# Patient Record
Sex: MALE | Race: WHITE | NOT HISPANIC OR LATINO | URBAN - METROPOLITAN AREA
[De-identification: names, ages, dates, MRNs, and addresses within clinical notes are randomized per-mention and may not be internally consistent; named-entity substitution may affect disease eponyms.]

---

## 2017-01-04 NOTE — PATIENT DISCUSSION
Macular Hole Counseling:  I have reviewed with the patient the diagnosis of macular hole and its pathophysiology. Patient was instructed to call the office if there is a decrease in vision. Return for follow-up as scheduled.

## 2017-01-04 NOTE — PATIENT DISCUSSION
AMD (Dry) Counseling:  I have instructed the patient to take an AREDS 2 vitamin mixture to minimize the risk of disease progression. The importance of daily monitoring with Amsler grid was emphasized and an Amsler grid was provided if the patient did not have one. The patient was advised to call the office if new symptoms of persistent blurring or distortion of vision arise as evaluation and possible treatment is necessary to preserve as much vision as possible. Return for follow-up as scheduled.

## 2017-01-04 NOTE — PATIENT DISCUSSION
Macular Drusen Counseling:   I have explained the diagnosis of macular drusen and the role of drusen in development of macular degeneration. AREDS 2 multivitamin may have some benefit, as well as UV protection when outdoors and a nutritious diet, especially green leafy vegetables and fish to minimize the risk of developing macular degeneration. The patient was advised of the importance of annual dilated eye exams. Return for follow-up as scheduled.

## 2017-01-04 NOTE — PATIENT DISCUSSION
AMD (DRY), OD:  PRESCRIBE AREDS 2 VITAMINS / AMSLER GRID DAILY / UV PROTECTION. SMOKING CESSATION EMPHASIZED. RETURN FOR FOLLOW-UP AS SCHEDULED.

## 2017-01-04 NOTE — PATIENT DISCUSSION
MACULAR DRUSEN, OS:  PRESCRIBE AREDS 2 VITAMINS AND INCREASE UV PROTECTION. STRESS GOOD DIET AND NO SMOKING. RETURN FOR FOLLOW-UP AS SCHEDULED.

## 2017-01-04 NOTE — PATIENT DISCUSSION
MILD DRY EYE, OU: PRESCRIBED REFRESH OPTIVE  ARTIFICIAL TEARS PRN OU. RECOMMENDS OMEGA-3 FISH OIL WITH PRIMARY CARE PHYSICIANS APPROVAL. RETURN FOR FOLLOW-UP AS SCHEDULED OR SOONER IF SYMPTOMS WORSEN.

## 2017-01-04 NOTE — PATIENT DISCUSSION
*Posterior Capsular Fibrosis Counseling: The posterior capsule is a thin, clear film that is behind the intraocular lens implant. When it becomes cloudy, it is called posterior capsular fibrosis. The diagnosis of posterior capsular fibrosis (PCF), also referred to as a secondary cataract or posterior capsular opacification (PCO), was discussed with the patient. Return for follow-up as scheduled or sooner if there is a change in vision.

## 2017-01-04 NOTE — PATIENT DISCUSSION
*PCF OU; BECOMING VISUALLY SIGNIFICANT BUT NOT BOTHERSOME TO PATIENT AT THIS TIME. CONTINUE TO FOLLOW FOR NOW. OFFER SPEC RX UPDATE.

## 2017-06-27 NOTE — PATIENT DISCUSSION
I have asked her to return to Dr. Janee Hassan for general ophthalmic care in the near future. I am very pleased with her surgical outcome. She is to return here on an as needed basis.

## 2017-11-29 NOTE — PATIENT DISCUSSION
MACULAR DRUSEN, OS :   PRESCRIBE AREDS 2 VITAMINS AND INCREASE UV PROTECTION. STRESS GOOD DIET AND NO SMOKING. RETURN FOR FOLLOW-UP AS SCHEDULED.

## 2017-11-29 NOTE — PATIENT DISCUSSION
AMD (DRY), OD:  PRESCRIBE AREDS 2 VITAMINS / AMSLER GRID DAILY / UV PROTECTION. SMOKING AVOIDANCE ADVISED. RETURN FOR FOLLOW-UP AS SCHEDULED.

## 2018-05-02 ENCOUNTER — NEW REFERRAL (OUTPATIENT)
Dept: URBAN - METROPOLITAN AREA CLINIC 26 | Facility: CLINIC | Age: 58
End: 2018-05-02

## 2018-05-02 VITALS
WEIGHT: 253 LBS | BODY MASS INDEX: 34.27 KG/M2 | DIASTOLIC BLOOD PRESSURE: 80 MMHG | HEIGHT: 72 IN | SYSTOLIC BLOOD PRESSURE: 107 MMHG | HEART RATE: 67 BPM

## 2018-05-02 DIAGNOSIS — H02.836: ICD-10-CM

## 2018-05-02 DIAGNOSIS — H02.833: ICD-10-CM

## 2018-05-02 DIAGNOSIS — H25.13: ICD-10-CM

## 2018-05-02 DIAGNOSIS — H43.393: ICD-10-CM

## 2018-05-02 DIAGNOSIS — H35.3132: ICD-10-CM

## 2018-05-02 DIAGNOSIS — H04.123: ICD-10-CM

## 2018-05-02 DIAGNOSIS — H35.433: ICD-10-CM

## 2018-05-02 DIAGNOSIS — E11.9: ICD-10-CM

## 2018-05-02 PROCEDURE — 99204 OFFICE O/P NEW MOD 45 MIN: CPT

## 2018-05-02 PROCEDURE — 92235 FLUORESCEIN ANGRPH MLTIFRAME: CPT

## 2018-05-02 PROCEDURE — 92134 CPTRZ OPH DX IMG PST SGM RTA: CPT

## 2018-05-02 PROCEDURE — 92250 FUNDUS PHOTOGRAPHY W/I&R: CPT

## 2018-05-02 ASSESSMENT — VISUAL ACUITY
OD_SC: 20/40-1
OD_CC: 20/20-1
OS_CC: 20/20
OS_SC: 20/80+1

## 2018-05-02 ASSESSMENT — TONOMETRY
OS_IOP_MMHG: 13
OD_IOP_MMHG: 17

## 2019-05-07 ENCOUNTER — FOLLOW UP (OUTPATIENT)
Dept: URBAN - METROPOLITAN AREA CLINIC 26 | Facility: CLINIC | Age: 59
End: 2019-05-07

## 2019-05-07 VITALS — HEIGHT: 72 IN | BODY MASS INDEX: 35.21 KG/M2 | WEIGHT: 260 LBS

## 2019-05-07 DIAGNOSIS — E11.9: ICD-10-CM

## 2019-05-07 DIAGNOSIS — H02.833: ICD-10-CM

## 2019-05-07 DIAGNOSIS — H43.393: ICD-10-CM

## 2019-05-07 DIAGNOSIS — H25.13: ICD-10-CM

## 2019-05-07 DIAGNOSIS — H02.836: ICD-10-CM

## 2019-05-07 DIAGNOSIS — H35.433: ICD-10-CM

## 2019-05-07 DIAGNOSIS — H04.123: ICD-10-CM

## 2019-05-07 DIAGNOSIS — H35.3132: ICD-10-CM

## 2019-05-07 PROCEDURE — 92134 CPTRZ OPH DX IMG PST SGM RTA: CPT

## 2019-05-07 PROCEDURE — 92014 COMPRE OPH EXAM EST PT 1/>: CPT

## 2019-05-07 PROCEDURE — 92250 FUNDUS PHOTOGRAPHY W/I&R: CPT

## 2019-05-07 ASSESSMENT — TONOMETRY
OD_IOP_MMHG: 15
OS_IOP_MMHG: 16

## 2019-05-07 ASSESSMENT — VISUAL ACUITY
OS_CC: 20/20
OD_CC: 20/20-1

## 2019-09-25 NOTE — PATIENT DISCUSSION
AMD (DRY), OU:  PRESCRIBE AREDS 2 VITAMINS AND RECOMMEND UV PROTECTION. PATIENT IS AWARE OF AMSLER GRID AND HOW TO CHECK FOR PROGRESSION. SMOKING AVOIDANCE ADVISED. RETURN FOR FOLLOW-UP AS SCHEDULED. REFER PT TO DR. LIEBERMAN FOR EVALUATION.

## 2019-09-25 NOTE — PATIENT DISCUSSION
MACULAR CYST/ PSEUDOHOLE, OU:  OLD, STABLE. RETURN FOR FOLLOW-UP AS SCHEDULED. REFER TO DR. LIEBERMAN FOR EVALUATION.

## 2019-09-25 NOTE — PATIENT DISCUSSION
Continue: PreserVision AREDS 2 (vit c,n-nv-tepkt-lutein-zeaxan): capsule: 526-742-05-0 mg-unit-mg-mg 1 capsule twice a day as directed by mouth

## 2019-10-21 NOTE — PATIENT DISCUSSION
S/P YAG OS: STABLE. NO HOLES OR TEARS FOUND ON TODAY'S DILATED EXAM.  OFFER UPDATED SPEC RX.  RETURN AS SCHEDULED.

## 2019-10-21 NOTE — PATIENT DISCUSSION
Continue: PreserVision AREDS 2 (vit c,b-si-qqqfm-lutein-zeaxan): capsule: 038-443-35-4 mg-unit-mg-mg 1 capsule twice a day as directed by mouth

## 2019-10-30 NOTE — PATIENT DISCUSSION
Macular hole has closed following surgery with thinning noted on exam and OCT today.
Macular hole has closed following surgery.
Recommended seeing primary eye care provider in the future.
patient

## 2020-07-22 ENCOUNTER — FOLLOW UP (OUTPATIENT)
Dept: URBAN - METROPOLITAN AREA CLINIC 26 | Facility: CLINIC | Age: 60
End: 2020-07-22

## 2020-07-22 VITALS — HEIGHT: 72 IN | BODY MASS INDEX: 35.21 KG/M2 | WEIGHT: 260 LBS

## 2020-07-22 DIAGNOSIS — E11.9: ICD-10-CM

## 2020-07-22 DIAGNOSIS — D31.32: ICD-10-CM

## 2020-07-22 DIAGNOSIS — H35.433: ICD-10-CM

## 2020-07-22 DIAGNOSIS — H35.3132: ICD-10-CM

## 2020-07-22 DIAGNOSIS — H43.393: ICD-10-CM

## 2020-07-22 PROCEDURE — 92250 FUNDUS PHOTOGRAPHY W/I&R: CPT

## 2020-07-22 PROCEDURE — 92134 CPTRZ OPH DX IMG PST SGM RTA: CPT

## 2020-07-22 PROCEDURE — 92014 COMPRE OPH EXAM EST PT 1/>: CPT

## 2020-07-22 ASSESSMENT — TONOMETRY
OD_IOP_MMHG: 14
OS_IOP_MMHG: 10

## 2020-07-22 ASSESSMENT — VISUAL ACUITY
OS_CC: 20/20
OD_CC: 20/20-2

## 2020-10-05 NOTE — PATIENT DISCUSSION
Mrs. Fifi Polo hole's remain closed following surgical repair. I will see her on an as needed basis.

## 2021-01-22 NOTE — PATIENT DISCUSSION
Continue: PreserVision AREDS 2 (vit c,e-yx-hpflw-lutein-zeaxan): capsule: 452-915-84-1 mg-unit-mg-mg 1 capsule twice a day as directed by mouth

## 2021-01-22 NOTE — PATIENT DISCUSSION
GLAUCOMA SUSPECT, OU :  POSITIVE FAMILY HISTORY. OPTIC NERVE CUPPING OU. RETURN FOR FOLLOW UP AS SCHEDULED.

## 2021-07-21 ENCOUNTER — FOLLOW UP (OUTPATIENT)
Dept: URBAN - METROPOLITAN AREA CLINIC 26 | Facility: CLINIC | Age: 61
End: 2021-07-21

## 2021-07-21 VITALS — WEIGHT: 215 LBS | BODY MASS INDEX: 29.12 KG/M2 | HEIGHT: 72 IN

## 2021-07-21 DIAGNOSIS — H43.393: ICD-10-CM

## 2021-07-21 DIAGNOSIS — H35.433: ICD-10-CM

## 2021-07-21 DIAGNOSIS — H35.3132: ICD-10-CM

## 2021-07-21 DIAGNOSIS — D31.32: ICD-10-CM

## 2021-07-21 DIAGNOSIS — E11.9: ICD-10-CM

## 2021-07-21 PROCEDURE — 92134 CPTRZ OPH DX IMG PST SGM RTA: CPT

## 2021-07-21 PROCEDURE — 92014 COMPRE OPH EXAM EST PT 1/>: CPT

## 2021-07-21 PROCEDURE — 92250 FUNDUS PHOTOGRAPHY W/I&R: CPT

## 2021-07-21 ASSESSMENT — VISUAL ACUITY
OD_CC: 20/20-1
OS_CC: 20/20-1

## 2021-07-21 ASSESSMENT — TONOMETRY
OD_IOP_MMHG: 16
OS_IOP_MMHG: 13

## 2022-07-20 ENCOUNTER — FOLLOW UP (OUTPATIENT)
Dept: URBAN - METROPOLITAN AREA CLINIC 26 | Facility: CLINIC | Age: 62
End: 2022-07-20

## 2022-07-20 VITALS — WEIGHT: 219 LBS | BODY MASS INDEX: 29.03 KG/M2 | HEIGHT: 73 IN

## 2022-07-20 DIAGNOSIS — H35.433: ICD-10-CM

## 2022-07-20 DIAGNOSIS — H43.393: ICD-10-CM

## 2022-07-20 DIAGNOSIS — D31.32: ICD-10-CM

## 2022-07-20 DIAGNOSIS — H04.123: ICD-10-CM

## 2022-07-20 DIAGNOSIS — E11.9: ICD-10-CM

## 2022-07-20 DIAGNOSIS — H35.3132: ICD-10-CM

## 2022-07-20 PROCEDURE — 92134 CPTRZ OPH DX IMG PST SGM RTA: CPT

## 2022-07-20 PROCEDURE — 92014 COMPRE OPH EXAM EST PT 1/>: CPT

## 2022-07-20 PROCEDURE — 92250 FUNDUS PHOTOGRAPHY W/I&R: CPT

## 2022-07-20 ASSESSMENT — VISUAL ACUITY
OS_CC: 20/20-1
OD_CC: 20/20-2

## 2022-07-20 ASSESSMENT — TONOMETRY
OS_IOP_MMHG: 14
OD_IOP_MMHG: 13

## 2022-07-30 ENCOUNTER — TELEPHONE ENCOUNTER (OUTPATIENT)
Age: 62
End: 2022-07-30

## 2022-07-31 ENCOUNTER — TELEPHONE ENCOUNTER (OUTPATIENT)
Age: 62
End: 2022-07-31

## 2022-08-22 NOTE — PATIENT DISCUSSION
Lens Material: A-T Advancement Flap Text: The defect edges were debeveled with a #15 scalpel blade.  Given the location of the defect, shape of the defect and the proximity to free margins an A-T advancement flap was deemed most appropriate.  Using a sterile surgical marker, an appropriate advancement flap was drawn incorporating the defect and placing the expected incisions within the relaxed skin tension lines where possible.    The area thus outlined was incised deep to adipose tissue with a #15 scalpel blade.  The skin margins were undermined to an appropriate distance in all directions utilizing iris scissors.

## 2023-08-23 ENCOUNTER — FOLLOW UP (OUTPATIENT)
Dept: URBAN - METROPOLITAN AREA CLINIC 26 | Facility: CLINIC | Age: 63
End: 2023-08-23

## 2023-08-23 VITALS — WEIGHT: 220 LBS | HEIGHT: 72 IN | BODY MASS INDEX: 29.8 KG/M2

## 2023-08-23 DIAGNOSIS — D31.32: ICD-10-CM

## 2023-08-23 DIAGNOSIS — E11.9: ICD-10-CM

## 2023-08-23 DIAGNOSIS — H04.123: ICD-10-CM

## 2023-08-23 DIAGNOSIS — H43.393: ICD-10-CM

## 2023-08-23 DIAGNOSIS — H35.433: ICD-10-CM

## 2023-08-23 DIAGNOSIS — H35.3132: ICD-10-CM

## 2023-08-23 PROCEDURE — 92250 FUNDUS PHOTOGRAPHY W/I&R: CPT

## 2023-08-23 PROCEDURE — 92014 COMPRE OPH EXAM EST PT 1/>: CPT

## 2023-08-23 PROCEDURE — 92134 CPTRZ OPH DX IMG PST SGM RTA: CPT

## 2023-08-23 ASSESSMENT — TONOMETRY
OD_IOP_MMHG: 13
OS_IOP_MMHG: 13

## 2023-08-23 ASSESSMENT — VISUAL ACUITY
OS_CC: 20/25-1
OD_CC: 20/20

## 2024-08-28 ENCOUNTER — FOLLOW UP (OUTPATIENT)
Dept: URBAN - METROPOLITAN AREA CLINIC 26 | Facility: CLINIC | Age: 64
End: 2024-08-28

## 2024-08-28 VITALS — WEIGHT: 223 LBS | BODY MASS INDEX: 30.2 KG/M2 | HEIGHT: 72 IN

## 2024-08-28 DIAGNOSIS — H43.393: ICD-10-CM

## 2024-08-28 DIAGNOSIS — D31.32: ICD-10-CM

## 2024-08-28 DIAGNOSIS — H35.3132: ICD-10-CM

## 2024-08-28 DIAGNOSIS — H02.833: ICD-10-CM

## 2024-08-28 DIAGNOSIS — E11.9: ICD-10-CM

## 2024-08-28 DIAGNOSIS — H02.836: ICD-10-CM

## 2024-08-28 DIAGNOSIS — H35.433: ICD-10-CM

## 2024-08-28 DIAGNOSIS — H25.13: ICD-10-CM

## 2024-08-28 DIAGNOSIS — H04.123: ICD-10-CM

## 2024-08-28 PROCEDURE — 92014 COMPRE OPH EXAM EST PT 1/>: CPT

## 2024-08-28 PROCEDURE — 92134 CPTRZ OPH DX IMG PST SGM RTA: CPT

## 2024-08-28 PROCEDURE — 92250 FUNDUS PHOTOGRAPHY W/I&R: CPT | Mod: 59

## 2024-08-28 ASSESSMENT — TONOMETRY
OD_IOP_MMHG: 13
OS_IOP_MMHG: 12

## 2024-08-28 ASSESSMENT — VISUAL ACUITY
OD_CC: 20/20
OS_CC: 20/20-1